# Patient Record
Sex: MALE | Race: WHITE | NOT HISPANIC OR LATINO | URBAN - NONMETROPOLITAN AREA
[De-identification: names, ages, dates, MRNs, and addresses within clinical notes are randomized per-mention and may not be internally consistent; named-entity substitution may affect disease eponyms.]

---

## 2020-03-13 ENCOUNTER — NURSE TRIAGE (OUTPATIENT)
Dept: CALL CENTER | Facility: HOSPITAL | Age: 40
End: 2020-03-13

## 2020-03-13 NOTE — TELEPHONE ENCOUNTER
Hub Call, Safia..... This pt. is on his way home from [place], [place], his wife was seen at [facility] , yesterday, saw Cyn Oleary, and had enough symptoms that she is on self, home, quarantine, and he is calling to be seen by same Physican, he is not established pt., he said Cyn told wife for him to call and be seen by same Physican. Danyell at [facility] was called by Triage center and they will help him. Pt. Was also told if they could not see him to be seen by his PCP or clinic and triage was done.    Reason for Disposition  • [1] Cough occurs AND [2] within 14 days of COVID-19 EXPOSURE    Additional Information  • Negative: Severe difficulty breathing (e.g., struggling for each breath, speak in single words, bluish lips)  • Negative: Sounds like a life-threatening emergency to the triager  • Negative: [1] Difficulty breathing (shortness of breath) occurs AND [2] onset > 14 days after COVID-19 EXPOSURE (Close Contact)  • Negative: [1] Dry cough occurs AND [2] onset > 14 days after COVID-19 EXPOSURE  • Negative: [1] Wet cough (i.e., white-yellow, yellow, green, or adrienne colored sputum) AND [2] onset > 14 days after COVID-19 EXPOSURE  • Negative: [1] Common cold symptoms AND [2] onset > 14 days after COVID-19 EXPOSURE  • Negative: [1] Difficulty breathing occurs AND [2] within 14 days of COVID-19 EXPOSURE  • Negative: Patient sounds very sick or weak to the triager  • Negative: [1] Fever or feeling feverish AND [2] within 14 Days of COVID-19 EXPOSURE  • Negative: [1] Fever (or feeling feverish) OR symptoms of lower respiratory illness (e.g., cough, difficulty breathing) AND [2] TRAVEL FROM [place] (or other [facility] identified high risk travel area) within last 14 days  • Negative: [1] COVID-19 EXPOSURE within last 14 days AND [2] NO cough, fever, or breathing difficulty AND [3] exposed person is a healthcare worker who was NOT using all recommended personal protective equipment (i.e., a respirator-N95  "mask, eye protection, gloves, and gown)  • Negative: [1] COVID-19 EXPOSURE within last 14 days AND [2] NO cough, fever, or breathing difficulty  • Negative: [1] COVID-19 EXPOSURE within last 14 days AND [2] mild body aches, chills, diarrhea, headache, runny nose, or sore throat occur  • Negative: [1] TRAVEL FROM [place] (or other CDC identified high risk travel area) within last 14 days AND [2] NO cough or fever or breathing difficulty  • Negative: [1] COVID-19 EXPOSURE 15 or more days ago AND [2] NO cough or fever or breathing difficulty  • Negative: [1] No COVID-19 EXPOSURE BUT [2] living with someone who was exposed and who has no fever or cough    Answer Assessment - Initial Assessment Questions  1. PLACE of CONTACT: \"Where were you when you were exposed to COVID-19  (coronavirus disease 2019)?\" (e.g., city, state, country)      Unsure but wife has symptoms has enough symptoms quarantined at home,  is on way home with symptoms  2. TYPE of CONTACT: \"How much contact was there?\" (e.g., live in same house, work in same office, same school)      Lives in house intimate contact  3. DATE of CONTACT: \"When did you have contact with a coronavirus patient?\" (e.g., days)      Lives with pt.   4. DURATION of CONTACT: \"How long were you in contact with the COVID-19 (coronavirus disease) patient?\" (e.g., a few seconds, passed by person, a few minutes, live with the patient)      Lives with pt.   5. SYMPTOMS: \"Do you have any symptoms?\" (e.g., fever, cough, breathing difficulty)      Dry cough, tightness chest, sob  6. PREGNANCY OR POSTPARTUM: \"Is there any chance you are pregnant?\" \"When was your last menstrual period?\" \"Did you deliver in the last 2 weeks?\"      no  7. HIGH RISK: \"Do you have any heart or lung problems? Do you have a weakened immune system?\" (e.g., CHF, COPD, asthma, HIV positive, chemotherapy, renal failure, diabetes mellitus, sickle cell anemia)      no    Protocols used: CORONAVIRUS (COVID-19) " EXPOSURE-ADULT-AH